# Patient Record
Sex: MALE | Race: WHITE | NOT HISPANIC OR LATINO | Employment: FULL TIME | URBAN - METROPOLITAN AREA
[De-identification: names, ages, dates, MRNs, and addresses within clinical notes are randomized per-mention and may not be internally consistent; named-entity substitution may affect disease eponyms.]

---

## 2023-07-31 ENCOUNTER — OFFICE VISIT (OUTPATIENT)
Dept: URGENT CARE | Facility: CLINIC | Age: 38
End: 2023-07-31
Payer: COMMERCIAL

## 2023-07-31 VITALS
DIASTOLIC BLOOD PRESSURE: 78 MMHG | WEIGHT: 197 LBS | HEIGHT: 72 IN | OXYGEN SATURATION: 97 % | RESPIRATION RATE: 20 BRPM | HEART RATE: 96 BPM | TEMPERATURE: 100 F | SYSTOLIC BLOOD PRESSURE: 152 MMHG | BODY MASS INDEX: 26.68 KG/M2

## 2023-07-31 DIAGNOSIS — R50.9 FEVER, UNSPECIFIED FEVER CAUSE: Primary | ICD-10-CM

## 2023-07-31 DIAGNOSIS — J02.0 STREP PHARYNGITIS: ICD-10-CM

## 2023-07-31 LAB
CTP QC/QA: YES
MOLECULAR STREP A: POSITIVE
POC MOLECULAR INFLUENZA A AGN: NEGATIVE
POC MOLECULAR INFLUENZA B AGN: NEGATIVE
SARS-COV-2 RDRP RESP QL NAA+PROBE: NEGATIVE

## 2023-07-31 PROCEDURE — 87651 POCT STREP A MOLECULAR: ICD-10-PCS | Mod: QW,,, | Performed by: PHYSICIAN ASSISTANT

## 2023-07-31 PROCEDURE — 96372 THER/PROPH/DIAG INJ SC/IM: CPT | Mod: ,,, | Performed by: PHYSICIAN ASSISTANT

## 2023-07-31 PROCEDURE — 87651 STREP A DNA AMP PROBE: CPT | Mod: QW,,, | Performed by: PHYSICIAN ASSISTANT

## 2023-07-31 PROCEDURE — 96372 PR INJECTION,THERAP/PROPH/DIAG2ST, IM OR SUBCUT: ICD-10-PCS | Mod: ,,, | Performed by: PHYSICIAN ASSISTANT

## 2023-07-31 PROCEDURE — 99213 PR OFFICE/OUTPT VISIT, EST, LEVL III, 20-29 MIN: ICD-10-PCS | Mod: 25,,, | Performed by: PHYSICIAN ASSISTANT

## 2023-07-31 PROCEDURE — 99213 OFFICE O/P EST LOW 20 MIN: CPT | Mod: 25,,, | Performed by: PHYSICIAN ASSISTANT

## 2023-07-31 PROCEDURE — 87502 POCT INFLUENZA A/B MOLECULAR: ICD-10-PCS | Mod: QW,,, | Performed by: PHYSICIAN ASSISTANT

## 2023-07-31 PROCEDURE — 87635 SARS-COV-2 COVID-19 AMP PRB: CPT | Mod: QW,,, | Performed by: PHYSICIAN ASSISTANT

## 2023-07-31 PROCEDURE — 87635: ICD-10-PCS | Mod: QW,,, | Performed by: PHYSICIAN ASSISTANT

## 2023-07-31 PROCEDURE — 87502 INFLUENZA DNA AMP PROBE: CPT | Mod: QW,,, | Performed by: PHYSICIAN ASSISTANT

## 2023-07-31 RX ORDER — CHLORHEXIDINE GLUCONATE ORAL RINSE 1.2 MG/ML
15 SOLUTION DENTAL 2 TIMES DAILY
Qty: 118 ML | Refills: 0 | Status: SHIPPED | OUTPATIENT
Start: 2023-07-31 | End: 2023-08-07

## 2023-07-31 RX ORDER — PREDNISONE 10 MG/1
10 TABLET ORAL 2 TIMES DAILY
Qty: 10 TABLET | Refills: 0 | Status: SHIPPED | OUTPATIENT
Start: 2023-07-31 | End: 2023-08-05

## 2023-07-31 RX ORDER — AMOXICILLIN 875 MG/1
875 TABLET, FILM COATED ORAL 2 TIMES DAILY
Qty: 20 TABLET | Refills: 0 | Status: SHIPPED | OUTPATIENT
Start: 2023-07-31 | End: 2023-08-10

## 2023-07-31 RX ORDER — DEXAMETHASONE SODIUM PHOSPHATE 100 MG/10ML
10 INJECTION INTRAMUSCULAR; INTRAVENOUS
Status: COMPLETED | OUTPATIENT
Start: 2023-07-31 | End: 2023-07-31

## 2023-07-31 RX ADMIN — DEXAMETHASONE SODIUM PHOSPHATE 10 MG: 100 INJECTION INTRAMUSCULAR; INTRAVENOUS at 09:07

## 2023-07-31 NOTE — LETTER
July 31, 2023      [facility]  [address] [address] [address]  Phone: [phone]  Fax: [phone]       Patient: Joni Abrams   YOB: 1985  Date of Visit: 07/31/2023    To Whom It May Concern:    Marisabel Abrams  was at [facility] on 07/31/2023. The patient may return to work/school on 08/02/2023 with no restrictions. If you have any questions or concerns, or if I can be of further assistance, please do not hesitate to contact me.    Sincerely,    Rianna Allan RT

## 2023-07-31 NOTE — PROGRESS NOTES
Subjective:      Patient ID: Joni Abrams is a 37 y.o. male.    Vitals:  height is 6' (1.829 m) and weight is 89.4 kg (197 lb). His oral temperature is 99.5 °F (37.5 °C). His blood pressure is 152/78 (abnormal) and his pulse is 96. His respiration is 20 and oxygen saturation is 97%.     Chief Complaint: Sore Throat    HPI:  Patient reports in the last 3 days having acute sore throat postnasal drip fever and fatigue not improved with over-the-counter medications presents to urgent Care for initial painful throat evaluation.  Alleviating factors include sudafed, nyquil-PM with mild amount of relief.        Constitution: Positive for fatigue and fever. Negative for chills.   HENT:  Positive for congestion, postnasal drip, sinus pressure, sore throat and trouble swallowing. Negative for ear pain, sinus pain and voice change.    Neck: Positive for neck pain. Negative for neck stiffness.   Cardiovascular: Negative.    Respiratory:  Negative for shortness of breath and wheezing.    Gastrointestinal: Negative.    Skin: Negative.  Negative for erythema.   Allergic/Immunologic: Negative.    Neurological:  Negative for dizziness, passing out and headaches.      Objective:     Physical Exam   Constitutional: He is oriented to person, place, and time. He appears well-developed. He is cooperative.  Non-toxic appearance.      Comments:Awake pleasant ambulatory male speaks in complete sentence     HENT:   Head: Normocephalic.   Ears:   Right Ear: Hearing, tympanic membrane, external ear and ear canal normal.   Left Ear: Hearing, tympanic membrane, external ear and ear canal normal.   Nose: Congestion present. No mucosal edema, rhinorrhea or nasal deformity. No epistaxis. Right sinus exhibits no maxillary sinus tenderness and no frontal sinus tenderness. Left sinus exhibits no maxillary sinus tenderness and no frontal sinus tenderness.      Comments: Mild  Mouth/Throat: Uvula is midline and mucous membranes are normal. Mucous  membranes are moist. No trismus in the jaw. Normal dentition. No uvula swelling. Posterior oropharyngeal erythema present. No oropharyngeal exudate or posterior oropharyngeal edema.      Comments: 2+ right tonsil edema, no palate petechiae no uvula swelling no uvula shift no trismus no drooling no muffled voice  Eyes: Conjunctivae and lids are normal. No scleral icterus.   Neck: Trachea normal and phonation normal. Neck supple.      Comments: 2 cm right anterior cervical lymph node mobile moderate tenderness to palpation No edema present. No erythema present. No neck rigidity present.   Cardiovascular: Normal rate, regular rhythm, normal heart sounds and normal pulses.   No murmur heard.Exam reveals no gallop.   Pulmonary/Chest: Effort normal and breath sounds normal. No respiratory distress. He has no decreased breath sounds. He has no wheezes. He has no rhonchi. He has no rales.   Musculoskeletal: Normal range of motion.         General: Normal range of motion.      Cervical back: He exhibits tenderness.   Lymphadenopathy:     He has cervical adenopathy.   Neurological: no focal deficit. He is alert and oriented to person, place, and time. No cranial nerve deficit. He exhibits normal muscle tone.   Skin: Skin is warm, dry, intact, not diaphoretic, not pale and no rash. No erythema   Psychiatric: His speech is normal and behavior is normal. Mood, judgment and thought content normal.   Nursing note and vitals reviewed.       Previous History      Review of patient's allergies indicates:   Allergen Reactions    Shellfish containing products        Past Medical History:   Diagnosis Date    Asthma      Current Outpatient Medications   Medication Instructions    amoxicillin (AMOXIL) 875 mg, Oral, 2 times daily    chlorhexidine (PERIDEX) 0.12 % solution 15 mLs, Mouth/Throat, 2 times daily    predniSONE (DELTASONE) 10 mg, Oral, 2 times daily     Past Surgical History:   Procedure Laterality Date    NO PAST SURGERIES        History reviewed. No pertinent family history.    Social History     Tobacco Use    Smoking status: Some Days     Current packs/day: 0.00     Types: Vaping with nicotine    Smokeless tobacco: Never        Physical Exam      Vital Signs Reviewed   BP (!) 152/78   Pulse 96   Temp 99.5 °F (37.5 °C) (Oral)   Resp 20   Ht 6' (1.829 m)   Wt 89.4 kg (197 lb)   SpO2 97%   BMI 26.72 kg/m²        Procedures    Procedures     Labs     Results for orders placed or performed in visit on 07/31/23   POCT COVID-19 Rapid Screening   Result Value Ref Range    POC Rapid COVID Negative Negative     Acceptable Yes    POCT Strep A, Molecular   Result Value Ref Range    Molecular Strep A, POC Positive (A) Negative     Acceptable Yes    POCT Influenza A/B MOLECULAR   Result Value Ref Range    POC Molecular Influenza A Ag Negative Negative, Not Reported    POC Molecular Influenza B Ag Negative Negative, Not Reported     Acceptable Yes        Assessment:     1. Fever, unspecified fever cause    2. Strep pharyngitis        Plan:     Positive strep, negative COVID, negative influenza testing today.    Recommend amoxicillin antibiotic coverage for strep throat bacterial infection.  Recommend alternating Tylenol and ibuprofen every 4-6 hours as needed for aches pains fever or chills.  May continue decongestant antihistamine nasal spray daily if needed for upper respiratory symptoms.  Recommend saltwater gargles throat lozenge, Chloraseptic spray or Peridex oral antiseptic gargle and spit if needed for temporary sore throat relief.  May start prednisone tomorrow to help reduce pain and inflammation in addition to steroid injection received today.  Recommend follow-up with primary care physician in 1 week for re-evaluation if not improving.  If throat swelling worsens or airway problems develop recommended emergency department evaluation sooner.  Fever, unspecified fever cause  -     POCT  COVID-19 Rapid Screening  -     POCT Strep A, Molecular  -     POCT Influenza A/B MOLECULAR    Strep pharyngitis    Other orders  -     dexAMETHasone injection 10 mg  -     amoxicillin (AMOXIL) 875 MG tablet; Take 1 tablet (875 mg total) by mouth 2 (two) times daily. for 10 days  Dispense: 20 tablet; Refill: 0  -     chlorhexidine (PERIDEX) 0.12 % solution; Use as directed 15 mLs in the mouth or throat 2 (two) times daily. for 7 days  Dispense: 118 mL; Refill: 0  -     predniSONE (DELTASONE) 10 MG tablet; Take 1 tablet (10 mg total) by mouth 2 (two) times daily. for 5 days  Dispense: 10 tablet; Refill: 0

## 2023-07-31 NOTE — PATIENT INSTRUCTIONS
Positive strep, negative COVID, negative influenza testing today.    Recommend amoxicillin antibiotic coverage for strep throat bacterial infection.  Recommend alternating Tylenol and ibuprofen every 4-6 hours as needed for aches pains fever or chills.  May continue decongestant antihistamine nasal spray daily if needed for upper respiratory symptoms.  Recommend saltwater gargles throat lozenge, Chloraseptic spray or Peridex oral antiseptic gargle and spit if needed for temporary sore throat relief.  May start prednisone tomorrow to help reduce pain and inflammation in addition to steroid injection received today.  Recommend follow-up with primary care physician in 1 week for re-evaluation if not improving.  If throat swelling worsens or airway problems develop recommended emergency department evaluation sooner.